# Patient Record
Sex: FEMALE | Race: WHITE | Employment: FULL TIME | URBAN - METROPOLITAN AREA
[De-identification: names, ages, dates, MRNs, and addresses within clinical notes are randomized per-mention and may not be internally consistent; named-entity substitution may affect disease eponyms.]

---

## 2019-10-22 LAB
CHLAMYDIA, EXTERNAL: NEGATIVE
HBSAG, EXTERNAL: NEGATIVE
HIV, EXTERNAL: NEGATIVE
N. GONORRHEA, EXTERNAL: NEGATIVE
RUBELLA, EXTERNAL: NORMAL
T. PALLIDUM, EXTERNAL: NEGATIVE

## 2020-05-08 LAB — GRBS, EXTERNAL: NEGATIVE

## 2020-05-27 ENCOUNTER — HOSPITAL ENCOUNTER (OUTPATIENT)
Dept: PREADMISSION TESTING | Age: 35
Discharge: HOME | End: 2020-05-27
Payer: COMMERCIAL

## 2020-05-27 DIAGNOSIS — Z20.822 ENCOUNTER FOR LABORATORY TESTING FOR COVID-19 VIRUS: ICD-10-CM

## 2020-05-27 PROCEDURE — 87635 SARS-COV-2 COVID-19 AMP PRB: CPT

## 2020-05-28 LAB — SARS-COV-2, COV2NT: NOT DETECTED

## 2020-06-04 ENCOUNTER — HOSPITAL ENCOUNTER (INPATIENT)
Age: 35
LOS: 3 days | Discharge: HOME | End: 2020-06-07
Attending: OBSTETRICS & GYNECOLOGY | Admitting: OBSTETRICS & GYNECOLOGY
Payer: COMMERCIAL

## 2020-06-04 PROBLEM — O09.90 HIGH-RISK PREGNANCY: Status: ACTIVE | Noted: 2020-06-04

## 2020-06-04 LAB
BASOPHILS # BLD: 0 K/UL (ref 0–0.1)
BASOPHILS NFR BLD: 0 % (ref 0–1)
DIFFERENTIAL METHOD BLD: ABNORMAL
EOSINOPHIL # BLD: 0.1 K/UL (ref 0–0.4)
EOSINOPHIL NFR BLD: 1 % (ref 0–7)
ERYTHROCYTE [DISTWIDTH] IN BLOOD BY AUTOMATED COUNT: 12.8 % (ref 11.5–14.5)
HCT VFR BLD AUTO: 32.1 % (ref 35–47)
HGB BLD-MCNC: 10.5 G/DL (ref 11.5–16)
IMM GRANULOCYTES # BLD AUTO: 0 K/UL (ref 0–0.04)
IMM GRANULOCYTES NFR BLD AUTO: 0 % (ref 0–0.5)
LYMPHOCYTES # BLD: 1.6 K/UL (ref 0.8–3.5)
LYMPHOCYTES NFR BLD: 16 % (ref 12–49)
MCH RBC QN AUTO: 30 PG (ref 26–34)
MCHC RBC AUTO-ENTMCNC: 32.7 G/DL (ref 30–36.5)
MCV RBC AUTO: 91.7 FL (ref 80–99)
MONOCYTES # BLD: 0.7 K/UL (ref 0–1)
MONOCYTES NFR BLD: 7 % (ref 5–13)
NEUTS SEG # BLD: 7.6 K/UL (ref 1.8–8)
NEUTS SEG NFR BLD: 76 % (ref 32–75)
NRBC # BLD: 0 K/UL (ref 0–0.01)
NRBC BLD-RTO: 0 PER 100 WBC
PLATELET # BLD AUTO: 154 K/UL (ref 150–400)
PMV BLD AUTO: 10.9 FL (ref 8.9–12.9)
RBC # BLD AUTO: 3.5 M/UL (ref 3.8–5.2)
WBC # BLD AUTO: 10 K/UL (ref 3.6–11)

## 2020-06-04 PROCEDURE — 85025 COMPLETE CBC W/AUTO DIFF WBC: CPT

## 2020-06-04 PROCEDURE — 65270000029 HC RM PRIVATE

## 2020-06-04 PROCEDURE — 3E033VJ INTRODUCTION OF OTHER HORMONE INTO PERIPHERAL VEIN, PERCUTANEOUS APPROACH: ICD-10-PCS | Performed by: OBSTETRICS & GYNECOLOGY

## 2020-06-04 PROCEDURE — 36415 COLL VENOUS BLD VENIPUNCTURE: CPT

## 2020-06-04 PROCEDURE — 75410000002 HC LABOR FEE PER 1 HR

## 2020-06-04 RX ORDER — FENTANYL CITRATE 50 UG/ML
50 INJECTION, SOLUTION INTRAMUSCULAR; INTRAVENOUS
Status: DISCONTINUED | OUTPATIENT
Start: 2020-06-04 | End: 2020-06-06 | Stop reason: HOSPADM

## 2020-06-04 RX ORDER — TERBUTALINE SULFATE 1 MG/ML
0.25 INJECTION SUBCUTANEOUS AS NEEDED
Status: DISCONTINUED | OUTPATIENT
Start: 2020-06-04 | End: 2020-06-06 | Stop reason: HOSPADM

## 2020-06-04 RX ORDER — OXYTOCIN/0.9 % SODIUM CHLORIDE 30/500 ML
0-25 PLASTIC BAG, INJECTION (ML) INTRAVENOUS
Status: DISCONTINUED | OUTPATIENT
Start: 2020-06-05 | End: 2020-06-06 | Stop reason: HOSPADM

## 2020-06-04 NOTE — PROGRESS NOTES
In to see patient  Cat 1 tracing  Cervix 2cm/50%/-3  Vertex  Worley placed, 60mL sterile saline placed in both uterine and vaginal balloons  Will start pitocin at 0400    GBS neg and COVID neg

## 2020-06-04 NOTE — H&P
Patient's Care Team  OB/GYN: Lizette Grigsby MD (VIRTUAL VISITS AVAILABLE): [facility], [place], [address] [facility], Ph [phone], Fax [phone] NPI: [id number]  Primary Care Provider: Jah Pratt: [address], [address] [facility], [address] [place] [address], Ph [phone], Fax [phone] NPI: [id number]  Patient's Pharmacies  [facility]ACY #[id number] [facility]): [address], [address], Ph [phone], Fax [phone]  06/03/2020 08:27 am  Wt: 196 lbs (88.9 kg) BP: 118/74     Allergies  Allergies not reviewed (last reviewed 02/21/2020)     NKDA   Medications  Reviewed Medications     dextroamphetamine-amphetamine 10 mg tablet  TAKE 2 AND 1/2 TABLETS BY MOUTH ONCE DAILY 09/15/19   [facility]   Prenatal 10/22/19   entered Mabel Vazquez   RhoGAM Ultra-Filtered PLUS 1,500 unit (300 mcg) intramuscular syringe  Inject 1 syringe(s) by intramuscular route. Administer Note: Injection administered without difficulty and patient tolerated well. 03/13/20   administered Napoleon Poe MD   sulfamethoxazole 800 mg-trimethoprim 160 mg tablet  TK 1 T PO BID FOR 10 DAYS 07/28/19   filled surescripts   Vaccines  Vaccines not reviewed (last reviewed 10/22/2019)    Vaccine Type Date Amt. Route Site [address] Lot # Mfr. Exp.   Date Date  on VIS VIS  Given Vaccinator   Diphtheria, Tetanus, Pertussis   Tdap 03/13/20 0.5 mL Intramuscular Deltoid, Right   GlaxoSmithKline 08/27/22 02/24/15 03/13/20 ELVIRA [address]                                                     Hepatitis B   Hep B 12/28/11                                                               Influenza   influenza, injectable, quadrivalent, preservative free 10/22/19 0.5 mL Intramuscular Deltoid, Left  [id number] Seqirus 06/30/20 08/15/19 10/22/19 Mabel Vazquez                                                     Problems  Reviewed Problems     Acute vaginitis - Onset: 08/31/2016 - Entered By: Shayna Malik MDSigned By: Shayna Malik MD Description: Vaginitis acute code: 616.10   Irregular periods - Onset: 2017 - Entered By: Wendi Spears MDSigned By: Wendi Spears MD Description: Irregular menses code: 0.2   Family history of breast cancer - Onset: 2011 - Entered By: Skye Dickerson MA - Team 4 SHPSigned By: Brandie Keller JOYA-BC Description: FAMILY HISTORY BREAST CANCER code: V17.4  Family History  Discussed Family History    Paternal Grandfather - Diabetes mellitus   Paternal Grandmother - Malignant tumor of breast   Social History  Discussed Social History  OB/GYN Social History  Tobacco Smoking Status: Never smoker  Marital status:  (Notes: stable relationship)  Number of children: 2  Are you working: Yes  Occupation: Property Management  On average, how many days per week do you engage in moderate to strenuous EXERCISE (like walking fast, running, jogging, dancing, swimming, biking, or other activities that cause a light or heavy sweat)?: (Notes: hot yoga bar)  How often do you have a DRINK containing ALCOHOL?: Never  Illicit drugs: no  Have you recently (within the last 12 weeks, or during a current pregnancy) traveled to or lived in a Zika-affected area?: N  Is blood transfusion acceptable in an emergency?: Y  Surgical History  Reviewed Surgical History     Tonsillectomy   Breast surgery - augmentation  GYN History  Reviewed GYN History  Date of LMP: (Notes: 18 had some bleeding post mirena removal, unsure if a period or not). Date of Last Pap Smear: 2017 (Notes: NIL). Abnormal Pap: Y (Notes: distant history). HPV Vaccine: N. Sexually Active?: Y.  STIs/STDs: N.  Current Birth Control Method: None. Obstetric History  Reviewed Obstetric History    TOTAL FULL PRE AB. I AB.  S ECTOPICS MULTIPLE LIVING   3 2      2   \"Jessica\" and \"Armond\"  Past Pregnancies  Date # Fetuses GA Wks Labor Length Birth Weight Sex Delivery Type Outcome Anesthesia Delivery Place  Labor Notes Source 2012 1 40.9 9 hrs. 8 lbs. 14 oz. F Vaginal delivery Full Term Birth [facility]  N Saida-Apgars 8&9. Second degree laceration;post dates cervidil historical   2014 1 39.1 10 hrs. 8 lbs. 8.99 oz. M Vaginal delivery Full Term Birth [facility]  Kacey Deloris - Apgars 9/9, 2nd degree laceration, desires circ historical   Pregnancy Problem List   RhD negative - A NEG   Advanced maternal age  - MT20 WNL, XX GBS neg    consider IOL first week  per patient request  PNR faxed to [facility] L&D  Genetic Screening and Infection History  Patient's Age Will Be 28 Years Or Older At Estimated Date of Delivery: Y  Other Infection History: Y, pt had the chicken pox. 2824: Thalassemia ([facility], [place], Mediterranean, Or  Background): MCV < 80: N  Neural Tube Defect (Meningomyelocele, Spina Bifida, Or Anencephaly): N  746: Congenital Heart Defect: N  7580: Down Syndrome: N  Antolin-Sachs (eg, Eva Shannon, [facility]): N  Canavan Disease: N  282: Sickle Cell Disease Or Trait (): N  Hemophilia Or Other Blood Disorders: N  359: Muscular Dystrophy: N  2770: Cystic Fibrosis: N  3334: Oceana's Chorea: N  319: Mental Retardation/Autism: N  If Yes, Was Person Tested For Fragile X?: N  Other Inherited Genetic Or Chromosomal Disorder: N  Maternal Metabolic Disorder (eg, Type 1 Diabetes, PKU): N  Patient Or [de-identified] Father Had A Child With Birth Defects Not Listed Above: N  Recurrent Pregnancy Loss, Or A Stillbirth: N  Medications (including Supplements, Vitamins, Herbs, OTC Drugs), Illicit/Recreational Drugs, Alcohol: N  If Yes, Agent(s) And Strength/Dosage: N  Any Other Genetic History: N  Live With Someone With TB Or Exposed To TB: N  Patient Or Partner Has History Of Genital Herpes: N  Rash Or Viral Illness Since Last Menstrual Period: N  History Of STD, Gonorrhea, Chlamydia, HPV, Syphilis: N  Prior GBS-infected child: N  History of Hepatitis: N, immunized against Hep B.   History of HIV: N  Prenatal Flowsheet  Fundus Pres FHR FM PLS Cervix Exam BP Wt Edema Glucose Protein Leukocytes Nitrite Ketones Blood   10/22/2019   7 wks 3 days   avaclavik                         164 No   106/70 133 lbs none         Comments: Has seen V previous pt of Dr. Roberto Carlos Sultana. [facility] delivery planned. FOB \" Pedrito\". Declined nuchal translucency and cystic fibrosis w Kem Arredondo. Interested in Maternity 21 so they can know gender early. Has been very nauseous, not drinking much water because it makes her nauseous. Visit schedule, deck, coverage reviewed. Flu shot today. Reviewed pt ok to RTC at >10w for [id number]. RTC 4 weeks for routine appt. 11/22/2019   11 wks 6 days   avaclavik                         164 No   106/66 140 lbs none none neg       Comments: Starting to feel better, occ HA, nausea comes and goes, no vomiting, had some spotting after intercourse, lasting 4-5 days. Doing well. RTC 4 weeks. AFP next appt. 12/20/2019   15 wks 6 days   avaclavik                         156 Yes   110/72 148 lbs none none neg       Comments: Doing well. Still has occasional headaches but thinks its due to lack of water, otherwise doing fine! Feels flutters. Staying local for holidays. AFP today. RTC 4 weeks. 01/20/2020   20 wks 2 days                                          01/20/2020   20 wks 2 days   avaclavik                        Vertex 142 Yes   110/62 159 lbs none none neg       Comments: EFW 397g/84%. Tired but thinks it could be because she's eating more. Has a \"heavy feeling\" in her vagina, particularly after intercourse. Otherwise doing well. Weight gain reviewed. RTC 4 weeks. 02/14/2020   23 wks 6 days   avaclavik                       23 cm Vertex 142 Yes   108/68 164 lbs none         Comments: Doing well. Some back pain but otherwise doing well!   02/21/2020   24 wks 6 days   ssarraf                         150 Yes   124/24 166 lbs none none neg       Comments: (Dr. Latoya Berman pt) Pt here for OB problem- abdominal rash. See HPI.   03/06/2020   26 wks 6 days   avaclavik                         147 Yes   108/70 171 lbs none none neg       Comments: rm 32 - OB Problem --Heart palpitations. SP02 WNL, HR WNL. Monitor for now. If persistent recommend cardiology. 03/13/2020   27 wks 6 days   avaclavik                       28 cm  155 Yes   116/62 173 lbs trace none neg       Comments: rm 32: Doing well. Glucola, TDaP, and Rhogam today. Travelling to [place] to get away from Coronavirus. Breast pump ordered   03/24/2020   29 wks 3 days   avaclavik                         155 Yes   126/72 179 lbs trace         Comments: Emotional, thinks its hormones and being at home with her kids, otherwise doing ok. Had a good trip to [place] -- was only at her parent's house. Wants IOL for 39w. Will tentatively plan for 6/2 --> [place]lav[place] will send in schedule request a little closer to the date. RTC 2 weeks   04/10/2020   31 wks 6 days   btozer                       32 cm Vertex 150 Yes   121/74 180 lbs trace none trace       Comments: Feeling well, no concerns. Covid-19 precautions reviewed and hospital changes discussed. Desires 39 week IOL. Discussed virtual visits. 04/24/2020   33 wks 6 days   btozer                          Yes   139/72 182 lbs none         Comments: Virtual visit- Feeling great, baby is active. No concerns. GBS next visit. 05/08/2020   35 wks 6 days   btozer                       36 cm Vertex 142 Yes  1cm / 30% / -4 110/72 185 lbs 1+         Comments: Getting more uncomfortable. Having some random contractions, nothing consistent. Sleep is more difficult. No n/v, bleeding or spotting. No LOF. Increase in swelling in hands, feet and legs. Feels like her vagina is swollen. GBS today, no pcn allergy. Baby is active, ready at home. Cervix is unfavorable -anticipate this will change, but advised against elective IOL if it doesn't - patient is in agreemnet. Plan IOL first week of June if cervix is favorable.    05/15/2020   36 wks 6 days btozer                       37 cm Vertex 146 Yes  1cm / 30% / -4 116/78 188 lbs 1+ none trace       Comments: Doing well! Reviewed GBS negative. Feeling uncomfortable. Desires IOL first week of June if cervix more favorable. 05/20/2020   37 wks 4 days   avaclavik                       36 cm Vertex 151 Yes  1cm / 30% / -4 110/68 192 lbs 1+         Comments: Getting more uncomfortable, ready. No HAs, n/v, bleeding or spotting. Reviewed COVID testing next week. Pre-reg reviewed. RTC 1 week if undelivered. Reviewed possible IOL 1st week of June.   05/27/2020   38 wks 4 days   avaclavik                       38 cm Vertex 145 Yes  1cm / 30% / -4 118/70 194 lbs 1+         Comments: Doing well. No changes no contractions/ LOF or VB. Ready to have baby! Would like induction late 39th week. Attempted membrane sweep today -- difficult due to long cervix. COVID testing today. Precautions and labor handout today. RTC 1 week -- will finalize delivery plan at next appt   06/03/2020   39 wks 4 days   avaclavik                        Vertex 140 Yes  2cm / 30% / -3 118/74 196 lbs 1+         Comments: Feels more swollen, more uncomfortable to make a fist, lips are swollen. Thinks she lost her mucous plug last Friday 5/29. No bleeding or spotting. No contractions but she does have some cramping. Strongly desires IOL ASAP. OB Lab Results    Result Value Ref.  Range Date Collected Date Reviewed Reviewed By Note   Initial Labs             Blood Type A  10/22/2019 10/24/2019 avaclavik        D (Rh) Type Negative  10/22/2019 10/24/2019 avaclavik        Antibody Screen Negative NEGATIVE 10/22/2019 10/24/2019 avaclavik        Antibody Screen Negative NEGATIVE 03/13/2020 03/17/2020 avaclavik        HCT - Initial 37.8 % 34.0-46.6 10/22/2019 10/24/2019 avaclavik        HGB - Initial 12.7 g/dL 11.1-15.9 10/22/2019 10/24/2019 avaclavik        MCV - Initial 89 fL 79-97 10/22/2019 10/24/2019 avaclavik        PLT - Initial 185 x10E3/uL 150-450 10/22/2019 10/24/2019 avaclavik        VDRL - Initial   10/22/2019 10/24/2019 avaclavik        Urine Culture/Screen No growth  10/22/2019 10/24/2019 avaclavik        HBsAg Negative NEGATIVE 10/22/2019 10/24/2019 avaclavik        HIV Counseling/Testing Non Reactive NON REACTIVE 10/22/2019 10/24/2019 avaclavik        Chlamydia - Initial Negative NEGATIVE 10/22/2019 10/24/2019 avaclavik        Gonorrhea - Initial Negative NEGATIVE 10/22/2019 10/24/2019 avaclavik        Varicella             Rubella 1.13 index IMMUNE >0.99 10/22/2019 10/24/2019 avaclavik    Optional Labs             HGB Electrophoresis             PPD/Quanta             Pap Test             Cystic Fibrosis             HPV             Antolin-Sachs             Familial Dysautonomia             Genetic Screening Tests             NST             TSH             Drug screen             HCV Ab             HCV RNA             Urinalysis         8-20 Week Labs             Ultrasound - Initial             1st Trimester Aneuploidy Risk Assessment             MSAFP/Multiple Markers   12/20/2019 12/22/2019 avaclavik        2nd Trimester Serum Screening             Amnio/CVS             Karyotype             Amniotic Fluid (AFP)         24-28 Week Labs             HCT - 24-28 Weeks 34.7 % 34.0-46.6 03/13/2020 03/17/2020 avaclavik        HGB - 24-28 Weeks 11.8 g/dL 11.1-15.9 03/13/2020 03/17/2020 avaclavik        MCV - 24-28 Weeks             PLT - 24-28 Weeks 164 x10E3/uL 150-450 03/13/2020 03/17/2020 avaclavik        Diabetes Screen 103 mg/dL  03/13/2020 03/17/2020 avaclavik        GTT (If Screen Abnormal)             D (Rh) Antibody Screen             Anti-D Immune Globulin (RhlG) Given (0 weeks)   03/13/2020 avaclavik acastle6    32-36 Week Labs             HCT - 32-36 Weeks             HGB - 32-36 Weeks             MCV - 32-36 Weeks             PLT - 32-36 Weeks             Ultrasound - 32-36 Weeks             HIV (When Indicated)             VDRL - 32-36 Weeks   2020 avaclavik        Gonorrhea - 32-36 Weeks             Chlamydia - 32-36 Weeks             Depression screening (when indicated)         35-37 Week Labs             Group B Strep Negative NEGATIVE 2020 btozer        Resistance testing if penicillin allergic         Other Labs             Other         Past Medical History  Discussed Past Medical History  Dermatologic Disorder: Y - Psoriasis  HPI  Pt is a 37 yo  at 41+ wga presenting for elective IOL. Pregnancy has been uncomplicated. ROS  Additionally reports: Except as noted in the HPI, the review of systems is negative for General, Breast, , Resp, GI, CV, Endo, MS, Psych and Heme. Physical Exam  Patient is a 65-year-old female. Constitutional: General Appearance: well developed and nourished. Eyes: Eyes extraocular movements intact and do not invoke pain. Eyelids normal appearance. Ears, Nose, Throat: Mouth: no swelling of face or lips and normal appearance. Neck: Thyroid: normal appearance. Lungs / Chest: Respiratory effort: unlabored and speaking in full sentences. Skin: General Skin normal general appearance. Neurologic: Cranial Nerves facial movement symmetric. Mental Status Exam: Orientation alert and oriented. Affect: appropriate affect. Mood: appropriate mood. Language and Speech: normal speech. Assessment / Plan  1. Routine  care  Z34.83: Encounter for supervision of other normal pregnancy, third trimester    2. Gestation period, 39 weeks -  Admit to L&D. Worley bulb for cervical ripening. Pitocin at MN. GBS neg . EFM/Owendale.  AROM PRN. Epidural PRN.   Anticipate .  Z3A.39: 39 weeks gestation of pregnancy

## 2020-06-04 NOTE — PROGRESS NOTES
1935: Bedside and Verbal shift change report given to JONH Mack, RN (oncoming nurse) by ASHLEIGH Barbour RN (offgoing nurse). Report included the following information SBAR, Kardex, Procedure Summary, Intake/Output, MAR, Accordion and Recent Results. 2030: Dr. Alec Shannon at pt bedside discussing plan of care for the night. Pt given opportunity to ask questions and verbalized understanding. 0400: Pitocin started    1744: Worley Balloon came out in pt bed.     0752: Dr. Mauri Vernon at bedside.   SVE per MD 4/50/-3

## 2020-06-05 ENCOUNTER — ANESTHESIA EVENT (OUTPATIENT)
Dept: LABOR AND DELIVERY | Age: 35
End: 2020-06-05
Payer: COMMERCIAL

## 2020-06-05 ENCOUNTER — ANESTHESIA (OUTPATIENT)
Dept: LABOR AND DELIVERY | Age: 35
End: 2020-06-05
Payer: COMMERCIAL

## 2020-06-05 PROCEDURE — 75410000000 HC DELIVERY VAGINAL/SINGLE

## 2020-06-05 PROCEDURE — 75410000003 HC RECOV DEL/VAG/CSECN EA 0.5 HR

## 2020-06-05 PROCEDURE — 0KQM0ZZ REPAIR PERINEUM MUSCLE, OPEN APPROACH: ICD-10-PCS | Performed by: OBSTETRICS & GYNECOLOGY

## 2020-06-05 PROCEDURE — 74011250636 HC RX REV CODE- 250/636: Performed by: OBSTETRICS & GYNECOLOGY

## 2020-06-05 PROCEDURE — 76060000078 HC EPIDURAL ANESTHESIA

## 2020-06-05 PROCEDURE — A4300 CATH IMPL VASC ACCESS PORTAL: HCPCS

## 2020-06-05 PROCEDURE — 75410000002 HC LABOR FEE PER 1 HR

## 2020-06-05 PROCEDURE — 74011250637 HC RX REV CODE- 250/637: Performed by: OBSTETRICS & GYNECOLOGY

## 2020-06-05 PROCEDURE — 00HU33Z INSERTION OF INFUSION DEVICE INTO SPINAL CANAL, PERCUTANEOUS APPROACH: ICD-10-PCS | Performed by: ANESTHESIOLOGY

## 2020-06-05 PROCEDURE — 77030019905 HC CATH URETH INTMIT MDII -A

## 2020-06-05 PROCEDURE — 74011250636 HC RX REV CODE- 250/636: Performed by: ANESTHESIOLOGY

## 2020-06-05 PROCEDURE — 65270000029 HC RM PRIVATE

## 2020-06-05 PROCEDURE — 74011000250 HC RX REV CODE- 250: Performed by: ANESTHESIOLOGY

## 2020-06-05 RX ORDER — OXYTOCIN/RINGER'S LACTATE 20/1000 ML
999 PLASTIC BAG, INJECTION (ML) INTRAVENOUS AS NEEDED
Status: DISCONTINUED | OUTPATIENT
Start: 2020-06-05 | End: 2020-06-07 | Stop reason: HOSPADM

## 2020-06-05 RX ORDER — NALOXONE HYDROCHLORIDE 0.4 MG/ML
0.4 INJECTION, SOLUTION INTRAMUSCULAR; INTRAVENOUS; SUBCUTANEOUS AS NEEDED
Status: DISCONTINUED | OUTPATIENT
Start: 2020-06-05 | End: 2020-06-06 | Stop reason: HOSPADM

## 2020-06-05 RX ORDER — FENTANYL CITRATE 50 UG/ML
100 INJECTION, SOLUTION INTRAMUSCULAR; INTRAVENOUS ONCE
Status: DISCONTINUED | OUTPATIENT
Start: 2020-06-05 | End: 2020-06-06 | Stop reason: HOSPADM

## 2020-06-05 RX ORDER — DOCUSATE SODIUM 100 MG/1
100 CAPSULE, LIQUID FILLED ORAL
Status: DISCONTINUED | OUTPATIENT
Start: 2020-06-05 | End: 2020-06-07 | Stop reason: HOSPADM

## 2020-06-05 RX ORDER — FENTANYL CITRATE 50 UG/ML
INJECTION, SOLUTION INTRAMUSCULAR; INTRAVENOUS AS NEEDED
Status: DISCONTINUED | OUTPATIENT
Start: 2020-06-05 | End: 2020-06-05 | Stop reason: HOSPADM

## 2020-06-05 RX ORDER — OXYTOCIN/RINGER'S LACTATE 20/1000 ML
125 PLASTIC BAG, INJECTION (ML) INTRAVENOUS AS NEEDED
Status: DISCONTINUED | OUTPATIENT
Start: 2020-06-05 | End: 2020-06-07 | Stop reason: HOSPADM

## 2020-06-05 RX ORDER — HYDROCORTISONE ACETATE PRAMOXINE HCL 2.5; 1 G/100G; G/100G
CREAM TOPICAL AS NEEDED
Status: DISCONTINUED | OUTPATIENT
Start: 2020-06-05 | End: 2020-06-07 | Stop reason: HOSPADM

## 2020-06-05 RX ORDER — EPHEDRINE SULFATE/0.9% NACL/PF 50 MG/5 ML
12.5 SYRINGE (ML) INTRAVENOUS
Status: DISCONTINUED | OUTPATIENT
Start: 2020-06-05 | End: 2020-06-06 | Stop reason: HOSPADM

## 2020-06-05 RX ORDER — SIMETHICONE 80 MG
80 TABLET,CHEWABLE ORAL
Status: DISCONTINUED | OUTPATIENT
Start: 2020-06-05 | End: 2020-06-07 | Stop reason: HOSPADM

## 2020-06-05 RX ORDER — SODIUM CHLORIDE, SODIUM LACTATE, POTASSIUM CHLORIDE, CALCIUM CHLORIDE 600; 310; 30; 20 MG/100ML; MG/100ML; MG/100ML; MG/100ML
125 INJECTION, SOLUTION INTRAVENOUS CONTINUOUS
Status: DISCONTINUED | OUTPATIENT
Start: 2020-06-05 | End: 2020-06-07 | Stop reason: HOSPADM

## 2020-06-05 RX ORDER — AMMONIA 15 % (W/V)
1 AMPUL (EA) INHALATION AS NEEDED
Status: DISCONTINUED | OUTPATIENT
Start: 2020-06-05 | End: 2020-06-07 | Stop reason: HOSPADM

## 2020-06-05 RX ORDER — BUPIVACAINE HYDROCHLORIDE 5 MG/ML
INJECTION, SOLUTION EPIDURAL; INTRACAUDAL AS NEEDED
Status: DISCONTINUED | OUTPATIENT
Start: 2020-06-05 | End: 2020-06-05 | Stop reason: HOSPADM

## 2020-06-05 RX ORDER — FENTANYL/BUPIVACAINE/NS/PF 2-1250MCG
1-16 PREFILLED PUMP RESERVOIR EPIDURAL CONTINUOUS
Status: DISCONTINUED | OUTPATIENT
Start: 2020-06-05 | End: 2020-06-07 | Stop reason: HOSPADM

## 2020-06-05 RX ORDER — LIDOCAINE HYDROCHLORIDE AND EPINEPHRINE 15; 5 MG/ML; UG/ML
INJECTION, SOLUTION EPIDURAL AS NEEDED
Status: DISCONTINUED | OUTPATIENT
Start: 2020-06-05 | End: 2020-06-05 | Stop reason: HOSPADM

## 2020-06-05 RX ORDER — SODIUM CHLORIDE 0.9 % (FLUSH) 0.9 %
5-40 SYRINGE (ML) INJECTION AS NEEDED
Status: DISCONTINUED | OUTPATIENT
Start: 2020-06-05 | End: 2020-06-07 | Stop reason: HOSPADM

## 2020-06-05 RX ORDER — BUPIVACAINE HYDROCHLORIDE 5 MG/ML
30 INJECTION, SOLUTION EPIDURAL; INTRACAUDAL AS NEEDED
Status: DISCONTINUED | OUTPATIENT
Start: 2020-06-05 | End: 2020-06-06 | Stop reason: HOSPADM

## 2020-06-05 RX ORDER — IBUPROFEN 400 MG/1
800 TABLET ORAL EVERY 8 HOURS
Status: DISCONTINUED | OUTPATIENT
Start: 2020-06-05 | End: 2020-06-07 | Stop reason: HOSPADM

## 2020-06-05 RX ORDER — ONDANSETRON 2 MG/ML
4 INJECTION INTRAMUSCULAR; INTRAVENOUS
Status: DISCONTINUED | OUTPATIENT
Start: 2020-06-05 | End: 2020-06-07 | Stop reason: HOSPADM

## 2020-06-05 RX ORDER — OXYCODONE AND ACETAMINOPHEN 5; 325 MG/1; MG/1
1 TABLET ORAL
Status: DISCONTINUED | OUTPATIENT
Start: 2020-06-05 | End: 2020-06-07 | Stop reason: HOSPADM

## 2020-06-05 RX ORDER — SODIUM CHLORIDE 0.9 % (FLUSH) 0.9 %
5-40 SYRINGE (ML) INJECTION EVERY 8 HOURS
Status: DISCONTINUED | OUTPATIENT
Start: 2020-06-05 | End: 2020-06-07 | Stop reason: HOSPADM

## 2020-06-05 RX ORDER — DIPHENHYDRAMINE HYDROCHLORIDE 50 MG/ML
12.5 INJECTION, SOLUTION INTRAMUSCULAR; INTRAVENOUS
Status: DISCONTINUED | OUTPATIENT
Start: 2020-06-05 | End: 2020-06-07 | Stop reason: HOSPADM

## 2020-06-05 RX ORDER — HYDROCORTISONE 1 %
CREAM (GRAM) TOPICAL AS NEEDED
Status: DISCONTINUED | OUTPATIENT
Start: 2020-06-05 | End: 2020-06-07 | Stop reason: HOSPADM

## 2020-06-05 RX ORDER — LIDOCAINE HYDROCHLORIDE 10 MG/ML
INJECTION INFILTRATION; PERINEURAL
Status: DISPENSED
Start: 2020-06-05 | End: 2020-06-06

## 2020-06-05 RX ORDER — DIPHENHYDRAMINE HYDROCHLORIDE 50 MG/ML
12.5 INJECTION, SOLUTION INTRAMUSCULAR; INTRAVENOUS
Status: DISCONTINUED | OUTPATIENT
Start: 2020-06-05 | End: 2020-06-06 | Stop reason: HOSPADM

## 2020-06-05 RX ADMIN — Medication 10 ML/HR: at 13:10

## 2020-06-05 RX ADMIN — OXYTOCIN 2 MILLI-UNITS/MIN: 10 INJECTION, SOLUTION INTRAMUSCULAR; INTRAVENOUS at 04:00

## 2020-06-05 RX ADMIN — LIDOCAINE HYDROCHLORIDE,EPINEPHRINE BITARTRATE 5 ML: 15; .005 INJECTION, SOLUTION EPIDURAL; INFILTRATION; INTRACAUDAL; PERINEURAL at 13:03

## 2020-06-05 RX ADMIN — IBUPROFEN 800 MG: 400 TABLET, FILM COATED ORAL at 21:54

## 2020-06-05 RX ADMIN — OXYTOCIN 12 MILLI-UNITS/MIN: 10 INJECTION, SOLUTION INTRAMUSCULAR; INTRAVENOUS at 06:47

## 2020-06-05 RX ADMIN — SODIUM CHLORIDE, SODIUM LACTATE, POTASSIUM CHLORIDE, AND CALCIUM CHLORIDE 125 ML/HR: 600; 310; 30; 20 INJECTION, SOLUTION INTRAVENOUS at 04:00

## 2020-06-05 RX ADMIN — OXYTOCIN 10 MILLI-UNITS/MIN: 10 INJECTION, SOLUTION INTRAMUSCULAR; INTRAVENOUS at 06:05

## 2020-06-05 RX ADMIN — SODIUM CHLORIDE, SODIUM LACTATE, POTASSIUM CHLORIDE, AND CALCIUM CHLORIDE 125 ML/HR: 600; 310; 30; 20 INJECTION, SOLUTION INTRAVENOUS at 11:56

## 2020-06-05 RX ADMIN — FENTANYL CITRATE 100 MCG: 50 INJECTION, SOLUTION INTRAMUSCULAR; INTRAVENOUS at 13:03

## 2020-06-05 RX ADMIN — BUPIVACAINE HYDROCHLORIDE 2 ML: 5 INJECTION, SOLUTION EPIDURAL; INTRACAUDAL; PERINEURAL at 13:03

## 2020-06-05 NOTE — PROGRESS NOTES
0750 Bedside and Verbal shift change report given to DESHAWN Bhandari RN  (oncoming nurse) by Tanner Modi RN  (offgoing nurse). Report included the following information SBAR, MAR and Recent Results. \    Bedside and Verbal shift change report given to Behzad Hurt RN  (oncoming nurse) by DESHAWN Bhandari RN  (offgoing nurse). Report included the following information SBAR, Procedure Summary, Intake/Output, MAR and Recent Results.

## 2020-06-05 NOTE — ANESTHESIA PROCEDURE NOTES
Epidural Block    Start time: 6/5/2020 12:55 PM  End time: 6/5/2020 1:05 PM  Performed by: Lisa Duenas MD  Authorized by: Lisa Duenas MD     Pre-Procedure  Indication: labor epidural    Preanesthetic Checklist: patient identified, risks and benefits discussed, anesthesia consent, site marked, patient being monitored, timeout performed and anesthesia consent    Timeout Time: 12:55        Epidural:   Patient position:  Left lateral decubitus  Prep region:  Lumbar  Prep: Chlorhexidine    Location:  L2-3    Needle and Epidural Catheter:   Needle Type:  Tuohy  Needle Gauge:  17 G  Injection Technique:  Loss of resistance using air  Attempts:  1  Catheter Size:  19 G  Events: no blood with aspiration, no cerebrospinal fluid with aspiration, no paresthesia and negative aspiration test    Test Dose:  Bupivacaine 0.25% and negative    Assessment:   Catheter Secured:  Tegaderm and tape  Insertion:  Uncomplicated  Patient tolerance:  Patient tolerated the procedure well with no immediate complications

## 2020-06-05 NOTE — PROGRESS NOTES
Labor Progress Note  Patient seen, fetal heart rate and contraction pattern evaluated. Comfortable w epidural.    VSS, pitocin @ 10  Fetal Heart Rate: moderate variability  Accelerations: yes  Decelerations: none`   Uterine Activity: q3min  Cervical Exam: 7/75/-3    Assessment/Plan:  Reassuring fetal status. Will continue current management.

## 2020-06-05 NOTE — PROGRESS NOTES
Labor Progress Note  Patient seen, fetal heart rate and contraction pattern evaluated. Feeling some contractions    VSS, pitocin @   Fetal Heart Rate: moderate variability  Accelerations: no  Decelerations: no  Uterine Activity: q2min  Cervical Exam: 4/50/-3  Membranes:  Intact    Assessment/Plan:  Reassuring fetal status. Will continue current management.   Epidural when desired

## 2020-06-05 NOTE — PROGRESS NOTES
1145 Bedside report received from Milton Watson RN.   1200 Dr Jnaice Villagomez at bedside, SVE 4/75/-2. AROM, clear fluid.    [address] Rene Noonan at bedside to place epidural.   1533 Dr Janice Villagomez at bedside, 7/75/-3.   1816 Dr Janice Villagomez at bedside, [address] 8/80/-2.   Airam Yanes Bedside report given to Tennille Parson RN

## 2020-06-05 NOTE — PROGRESS NOTES
Labor Progress Note  Patient seen, fetal heart rate and contraction pattern evaluated. Comfortable w epidural.     VSS, pitocin @ 10  Fetal Heart Rate: moderate variability  Accelerations: yes  Decelerations: variable  Uterine Activity: q3min  Cervical Exam: 8/75/-2      Assessment/Plan:  Reassuring fetal status. Will continue current management. Increase pit per protocol.

## 2020-06-05 NOTE — PROGRESS NOTES
Labor Progress Note  Assumed care from Dr. Gordon Party @ 39 weeks 5 days undergoing Elective IOL  Cook catheter in place. Occasional cramps  Patient seen, fetal heart rate and contraction pattern evaluated. VSS AF  Physical Exam:  Cervical Exam: not examined  Membranes:  Intact  Uterine Activity: Frequency: Irregular  Fetal Heart Rate: Reactive, Baseline 130  Accelerations: yes  Decelerations: none      Assessment/Plan:  Reassuring fetal status. GBS negative   Will continue to observe overnight for labor.     Plan for Pit @ 4 am      Shelbie Gee MD

## 2020-06-05 NOTE — ANESTHESIA PREPROCEDURE EVALUATION
Relevant Problems   No relevant active problems       Anesthetic History   No history of anesthetic complications            Review of Systems / Medical History  Patient summary reviewed, nursing notes reviewed and pertinent labs reviewed    Pulmonary  Within defined limits                 Neuro/Psych   Within defined limits           Cardiovascular  Within defined limits                     GI/Hepatic/Renal  Within defined limits              Endo/Other  Within defined limits           Other Findings              Physical Exam    Airway  Mallampati: I  TM Distance: > 6 cm  Neck ROM: normal range of motion   Mouth opening: Normal     Cardiovascular  Regular rate and rhythm,  S1 and S2 normal,  no murmur, click, rub, or gallop             Dental  No notable dental hx       Pulmonary  Breath sounds clear to auscultation               Abdominal  GI exam deferred       Other Findings            Anesthetic Plan    ASA: 1  Anesthesia type: epidural          Induction: Intravenous  Anesthetic plan and risks discussed with: Patient

## 2020-06-05 NOTE — PROGRESS NOTES
Labor Progress Note  Patient seen, fetal heart rate and contraction pattern evaluated. Some contractions stronger. VSS, pitocin @ 20  Fetal Heart Rate: moderate variability  Accelerations: yes  Decelerations: variable  Uterine Activity: Irregular, q2-3min  Cervical Exam: 4/75/-2 - AROM clear      Assessment/Plan:  Reassuring fetal status. Will continue current management. Pit halved, will increase as needed.   Epidural when desired

## 2020-06-06 PROCEDURE — 65410000002 HC RM PRIVATE OB

## 2020-06-06 PROCEDURE — 74011250637 HC RX REV CODE- 250/637: Performed by: OBSTETRICS & GYNECOLOGY

## 2020-06-06 PROCEDURE — 85461 HEMOGLOBIN FETAL: CPT

## 2020-06-06 PROCEDURE — 74011250636 HC RX REV CODE- 250/636: Performed by: OBSTETRICS & GYNECOLOGY

## 2020-06-06 PROCEDURE — 86900 BLOOD TYPING SEROLOGIC ABO: CPT

## 2020-06-06 PROCEDURE — 36415 COLL VENOUS BLD VENIPUNCTURE: CPT

## 2020-06-06 RX ORDER — IBUPROFEN 800 MG/1
800 TABLET ORAL EVERY 8 HOURS
Qty: 30 TAB | Refills: 1 | Status: ON HOLD | OUTPATIENT
Start: 2020-06-06 | End: 2022-08-02 | Stop reason: SDUPTHER

## 2020-06-06 RX ADMIN — HUMAN RHO(D) IMMUNE GLOBULIN 0.3 MG: 300 INJECTION, SOLUTION INTRAMUSCULAR at 16:20

## 2020-06-06 RX ADMIN — IBUPROFEN 800 MG: 400 TABLET, FILM COATED ORAL at 06:04

## 2020-06-06 RX ADMIN — IBUPROFEN 800 MG: 400 TABLET, FILM COATED ORAL at 14:16

## 2020-06-06 RX ADMIN — DOCUSATE SODIUM 100 MG: 100 CAPSULE, LIQUID FILLED ORAL at 14:16

## 2020-06-06 RX ADMIN — OXYCODONE HYDROCHLORIDE AND ACETAMINOPHEN 1 TABLET: 5; 325 TABLET ORAL at 14:17

## 2020-06-06 RX ADMIN — OXYCODONE HYDROCHLORIDE AND ACETAMINOPHEN 1 TABLET: 5; 325 TABLET ORAL at 09:30

## 2020-06-06 RX ADMIN — OXYCODONE HYDROCHLORIDE AND ACETAMINOPHEN 1 TABLET: 5; 325 TABLET ORAL at 18:05

## 2020-06-06 RX ADMIN — IBUPROFEN 800 MG: 400 TABLET, FILM COATED ORAL at 21:37

## 2020-06-06 RX ADMIN — OXYCODONE HYDROCHLORIDE AND ACETAMINOPHEN 1 TABLET: 5; 325 TABLET ORAL at 04:37

## 2020-06-06 NOTE — DISCHARGE INSTRUCTIONS
POSTPARTUM DISCHARGE INSTRUCTIONS       Name:  Kristina Olson  YOB: 1985  Admission Diagnosis:  High-risk pregnancy [O09.90]     Discharge Diagnosis:    Problem List as of 6/6/2020 Date Reviewed: 10/21/2016          Codes Class Noted - Resolved    High-risk pregnancy ICD-10-CM: O09.90  ICD-9-CM: V23.9  6/4/2020 - Present        Normal delivery ICD-10-CM: O80  ICD-9-CM: 650  7/21/2014 - Present        Carrier of group B Streptococcus ICD-10-CM: Z22.330  ICD-9-CM: V02.51  8/12/2012 - Present        Rh negative status during pregnancy ICD-10-CM: O26.899, Z67.91  ICD-9-CM: 646.83  8/12/2012 - Present            Attending Physician:  Debbie Lindsey MD    Delivery Type:  Vaginal Childbirth with Episiotomy, Laceration or Tear: What To Expect At [address] [place] [address] body will slowly heal in the next few weeks. It is easy to get too tired and overwhelmed during the first weeks after your baby is born. Changes in your hormones can shift your mood without warning. You may find it hard to meet the extra demands on your energy and time. Take it easy on yourself. Follow-up care is a key part of your treatment and safety. Be sure to make and go to all appointments, and call your doctor if you are having problems. It's also a good idea to know your test results and keep a list of the medicines you take. How can you care for yourself at home? Vaginal Bleeding and Cramps  · After delivery, you will have a bloody discharge from the vagina. This will turn pink within a week and then white or yellow after about 10 days. It may last for 2 to 4 weeks or longer, until the uterus has healed. Use pads instead of tampons until you stop bleeding. · Do not worry if you pass some blood clots, as long as they are smaller than a golf ball. If you have a tear or stitches in your vaginal area, change the pad at least every 4 hours to prevent soreness and infection.     · You may have cramps for the first few days after childbirth. These are normal and occur as the uterus shrinks to normal size. Take an over-the-counter pain medicine, such as acetaminophen (Tylenol), ibuprofen (Advil, Motrin), or naproxen (Aleve), for cramps. Read and follow all instructions on the label. Do not take aspirin, because it can cause more bleeding. Do not take acetaminophen (Tylenol) and other acetaminophen containing medications (i.e. Percocet) at the same time. Episiotomy, Lacerations or Tears  · If you have stitches, they will dissolve on their own and do not need to be removed. · Put ice or a cold pack on your painful area for 10 to 20 minutes at a time, several times a day, for the first few days. Put a thin cloth between the ice and your skin. · Sit in a few inches of warm water (sitz bath) 3 times a day and after bowel movements. The warm water helps with pain and itching. If you do not have a tub, a warm shower might help. Breast fullness  · Your breasts may overfill (engorge) in the first few days after delivery. To help milk flow and to relieve pain, warm your breasts in the shower or by using warm, moist towels before nursing. · If you are not nursing, do not put warmth on your breasts or touch your breasts. Wear a tight bra or sports bra and use ice until the fullness goes away. This usually takes 2 to 3 days. · Put ice or a cold pack on your breast after nursing to reduce swelling and pain. Put a thin cloth between the ice and your skin. Activity  · Eat a balanced diet. Do not try to lose weight by cutting calories. Keep taking your prenatal vitamins, or take a multivitamin. · Get as much rest as you can. Try to take naps when your baby sleeps during the day. · Get some exercise every day. But do not do any heavy exercise until your doctor says it is okay. · Wait until you are healed (about 4 to 6 weeks) before you have sexual intercourse. Your doctor will tell you when it is okay to have sex.   · Talk to your doctor about birth control. You can get pregnant even before your period returns. Also, you can get pregnant while you are breast-feeding. Mental Health  · Many women get the \"baby blues\" during the first few days after childbirth. You may lose sleep, feel irritable, and cry easily. You may feel happy one minute and sad the next. Hormone changes are one cause of these emotional changes. Also, the demands of a new baby, along with visits from relatives or other family needs, add to a mother's stress. The \"baby blues\" often peak around the fourth day. Then they ease up in less than 2 weeks. · If your moodiness or anxiety lasts for more than 2 weeks, or if you feel like life is not worth living, you may have postpartum depression. This is different for each mother. Some mothers with serious depression may worry intensely about their infant's well-being. Others may feel distant from their child. Some mothers might even feel that they might harm their baby. A mother may have signs of paranoia, wondering if someone is watching her. · With all the changes in your life, you may not know if you are depressed. Pregnancy sometimes causes changes in how you feel that are similar to the symptoms of depression. · Symptoms of depression include:  · Feeling sad or hopeless and losing interest in daily activities. These are the most common symptoms of depression. · Sleeping too much or not enough. · Feeling tired. You may feel as if you have no energy. · Eating too much or too little. · [employer] (PSI) offers a Warm line; Chat with the Expert phone sessions; Information and Articles about Pregnancy and Postpartum Mood Disorders; Comprehensive List of Free Support Groups; Knowledgeable local coordinators who will offer support, information, and resources; Guide to Resources on [employer]; Calendar of events in the  mood disorders community;  Latest News and Research; and [facility] Section for Access and Networking. Remember - You are not alone; You are not to blame; With help, you will be well. 8-984-708-PPD(5725). WWW. POSTPARTUM. NET    · Writing or talking about death, such as writing suicide notes or talking about guns, knives, or pills. Keep the numbers for these national suicide hotlines: 7-518-520-TALK (9-417.368.4487) and 6-794-NCEZNFF (7-805.869.2123). If you or someone you know talks about suicide or feeling hopeless, get help right away. Constipation and Hemorrhoids  Drink plenty of fluids, enough so that your urine is light yellow or clear like water. If you have kidney, heart, or liver disease and have to limit fluids, talk with your doctor before you increase the amount of fluids you drink. · Eat plenty of fiber each day. Have a bran muffin or bran cereal for breakfast, and try eating a piece of fruit for a mid-afternoon snack. · For painful, itchy hemorrhoids, put ice or a cold pack on the area several times a day for 10 minutes at a time. Follow this by putting a warm compress on the area for another 10 to 20 minutes or by sitting in a shallow, warm bath. When should you call for help? Call 911 anytime you think you may need emergency care. For example, call if:  · You are thinking of hurting yourself, your baby, or anyone else. · You passed out (lost consciousness). · You have symptoms of a blood clot in your lung (called a pulmonary embolism). These may include:  · Sudden chest pain. · Trouble breathing. · Coughing up blood. Call your doctor now or seek immediate medical care if:  · You have severe vaginal bleeding. · You are soaking through a pad each hour for 2 or more hours. · Your vaginal bleeding seems to be getting heavier or is still bright red 4 days after delivery. · You are dizzy or lightheaded, or you feel like you may faint. · You are vomiting or cannot keep fluids down. · You have a fever. · You have new or more belly pain.   · You pass tissue (not just blood). · Your vaginal discharge smells bad. · Your belly feels tender or full and hard. · Your breasts are continuously painful or red. · You feel sad, anxious, or hopeless for more than a few days. · You have sudden, severe pain in your belly. · You have symptoms of a blood clot in your leg (called a deep vein thrombosis), such as:  · Pain in your calf, back of the knee, thigh, or groin. · Redness and swelling in your leg or groin. · You have symptoms of preeclampsia, such as:  · Sudden swelling of your face, hands, or feet. · New vision problems (such as dimness or blurring). · A severe headache. · Your blood pressure is higher than it should be or rises suddenly. · You have new nausea or vomiting. Watch closely for changes in your health, and be sure to contact your doctor if you have any problems. Additional Information:  Postpartum Support    PARENTS:  Are you feeling sad or depressed? Is it difficult for you to enjoy yourself? Do you feel more irritable or tense? Do you feel anxious or panicky? Are you having difficulty bonding with your baby? Do you feel as if you are \"out of control\" or \"going crazy\"? Are you worried that you might hurt your baby or yourself? FAMILIES: Do you worry that something is wrong but don't know how to help? Do you think that your partner or spouse is having problems coping? Are you worried that it may never get better? While many women experience some mild mood change or \"the blues\" during or after the birth of a child, 1 in 9 women experience more significant symptoms of depression or anxiety. 1 in 10 Dads become depressed during the first year. Things you can do  Being a good parent includes taking care of yourself. If you take care of yourself, you will be able to take better care of your baby and your family. · Talk to a counselor or healthcare provider who has training in  mood and anxiety problems.   · Learn as much as you can about pregnancy and postpartum depression and anxiety. · Get support from family and friends. Ask for help when you need it. · Join a support group in your area or online. · Keep active by walking, stretching or whatever form of exercise helps you to feel better. · Get enough rest and time for yourself. · Eat a healthy diet. · Don't give up! It may take more than one try to get the right help you need. These are general instructions for a healthy lifestyle:    No smoking/ No tobacco products/ Avoid exposure to second hand smoke    Surgeon General's Warning:  Quitting smoking now greatly reduces serious risk to your health. Obesity, smoking, and sedentary lifestyle greatly increases your risk for illness    A healthy diet, regular physical exercise & weight monitoring are important for maintaining a healthy lifestyle    Recognize signs and symptoms of STROKE:    F-face looks uneven    A-arms unable to move or move unevenly    S-speech slurred or non-existent    T-time-call 911 as soon as signs and symptoms begin - DO NOT go       back to bed or wait to see if you get better - TIME IS BRAIN. I have had the opportunity to make my options or choices for discharge. I have received and understand these instructions. Virtual postpartum support group meetings available at www. postpartumva.org

## 2020-06-06 NOTE — ROUTINE PROCESS
Bedside shift change report given to Aide Gray RNC (oncoming nurse) by Yash Craig RN (offgoing nurse). Report included the following information SBAR.

## 2020-06-06 NOTE — PROGRESS NOTES
Post-Partum Day Number 1 Progress Note    Max Viera     Assessment: Doing well, post partum day 1    Plan:  1. Continue routine postpartum and perineal care as well as maternal education. 2. N/A     Information for the patient's :  Moody Baez [[id number]]   Vaginal, Spontaneous   Patient doing well without significant complaint. Voiding without difficulty, normal lochia. Vitals:  Visit Vitals  BP 98/67 (BP 1 Location: Left arm, BP Patient Position: At rest;Sitting)   Pulse 75   Temp 97.8 °F (36.6 °C)   Resp 16   Ht 5' 6\" (1.676 m)   Wt 86.2 kg (190 lb)   Breastfeeding Unknown   BMI 30.67 kg/m²     Temp (24hrs), Av.2 °F (36.8 °C), Min:97.5 °F (36.4 °C), Max:98.7 °F (37.1 °C)        Exam:   Patient without distress. Abdomen soft, fundus firm, nontender                Perineum with normal lochia noted. Lower extremities are negative for swelling, cords or tenderness. Labs:     Lab Results   Component Value Date/Time    WBC 10.0 2020 05:45 PM    WBC 6.9 10/20/2016 03:44 PM    WBC 10.3 2014 06:49 AM    WBC 10.2 2012 12:30 AM    WBC 8.6 2011 02:25 AM    HGB 10.5 (L) 2020 05:45 PM    HGB 12.5 10/20/2016 03:44 PM    HGB 10.7 (L) 2014 06:49 AM    HGB 10.6 (L) 2012 12:30 AM    HGB 11.6 2011 02:25 AM    HCT 32.1 (L) 2020 05:45 PM    HCT 36.9 10/20/2016 03:44 PM    HCT 32.5 (L) 2014 06:49 AM    HCT 31.6 (L) 2012 12:30 AM    HCT 34.1 (L) 2011 02:25 AM    PLATELET 777  05:45 PM    PLATELET 179 6343 03:44 PM    PLATELET 959  06:49 AM    PLATELET 589  12:30 AM    PLATELET 265  02:25 AM       No results found for this or any previous visit (from the past 24 hour(s)).

## 2020-06-06 NOTE — ROUTINE PROCESS
TRANSFER - IN REPORT:    Verbal report received from [address] [place] [address] [address] , RN(name) on Cristina Mar  being received from L & D (unit) for routine progression of care      Report consisted of patients Situation, Background, Assessment and   Recommendations(SBAR). Information from the following report(s) SBAR, Intake/Output and MAR was reviewed with the receiving nurse. Opportunity for questions and clarification was provided. Assessment completed upon patients arrival to unit and care assumed.

## 2020-06-06 NOTE — L&D DELIVERY NOTE
Delivery Summary  Patient: Rama Cabrales             Circumcision:   NA-female  Additional Delivery Comments - pt pushed several times to delivery head OA, no nuchal, rest of body followed quickly and without difficulty. Baby to mom's chest, delayed cord clamping and cut by FOB. 2nd dgree perineal lac repaired in standard fashion w 2-0 vicryl. Placenta delivered spontaneously, intact, 3VC. EBL - 200        Information for the patient's :  Alec Cabello [[id number]]       Labor Events:    Labor: No    Steroids: None   Cervical Ripening Date/Time:       Cervical Ripening Type: [place]/EASI   Antibiotics During Labor: No   Rupture Identifier:      Rupture Date/Time: 2020 12:01 PM   Rupture Type: AROM   Amniotic Fluid Volume:  Moderate    Amniotic Fluid Description: Clear    Amniotic Fluid Odor:      Induction: Oxytocin       Induction Date/Time: 2020 4:00 AM    Indications for Induction: Elective    Augmentation: None   Augmentation Date/Time:      Indications for Augmentation:     Labor complications: None       Additional complications:        Delivery Events:  Indications For Episiotomy:     Episiotomy: None   Perineal Laceration(s): 2nd   Repaired:     Periurethral Laceration Location:      Repaired:     Labial Laceration Location:     Repaired:     Sulcal Laceration Location:     Repaired:     Vaginal Laceration Location:     Repaired:     Cervical Laceration Location:     Repaired:     Repair Suture: Vicryl 2-0   Number of Repair Packets: 1   Estimated Blood Loss (ml):  ml   Quantitative Blood Loss (ml)                Delivery Date: 2020    Delivery Time: 8:21 PM  Delivery Type: Vaginal, Spontaneous  Sex:  Female    Gestational Age: 37w11d   Delivery Clinician:  Lily Ansari  Living Status: Living   Delivery Location: L&D L&D Rm 10          APGARS  One minute Five minutes Ten minutes   Skin color: 1   2        Heart rate: 2   2        Grimace: 2   2        Muscle tone: 2 2        Breathin   2        Totals: 9   10            Presentation: Vertex    Position: Left      Resuscitation Method:  Tactile Stimulation     Meconium Stained: None      Cord Information: 3 Vessels  Complications: None  Cord around:    Delayed cord clamping? No  Cord clamped date/time:2020  8:23 AM  Disposition of Cord Blood: Lab    Blood Gases Sent?: No    Placenta:  Date/Time: 2020  8:34 PM  Removal: Spontaneous      Appearance: Normal     Long Bottom Measurements:  Birth Weight:        Birth Length:        Head Circumference:        Chest Circumference:       Abdominal Girth: Other Providers:   TOI APTEL, Obstetrician;Primary Nurse;Primary Long Bottom Nurse           Cord pH:  none    Episiotomy: None   Laceration(s): 2nd     Estimated Blood Loss (ml):     Labor Events  Method: Oxytocin      Augmentation: None   Cervical Ripening:        [facility] Problems  Date Reviewed: 10/21/2016          Codes Class Noted POA    High-risk pregnancy ICD-10-CM: O09.90  ICD-9-CM: V23.9  2020 Unknown              Operative Vaginal Delivery - none    Group B Strep:   Lab Results   Component Value Date/Time    GrBStrep, External negative 2020     Information for the patient's :  Ryan Sosa [[id number]]   No results found for: ABORH, PCTABR, PCTDIG, BILI, ABORHEXT, ABORH    No results found for: APH, APCO2, APO2, AHCO3, ABEC, ABDC, O2ST, EPHV, PCO2V, PO2V, HCO3V, EBEV, EBDV, SITE, RSCOM

## 2020-06-06 NOTE — DISCHARGE SUMMARY
Obstetrical Discharge Summary     Name: Hugo Morrow MRN: [id number]  SSN: [id number]    YOB: 1985  Age: 38 y.o. Sex: female      Admit Date: 2020    Discharge Date: 2020    Admitting Physician: Rick Jimenez MD     Attending Physician:  Ros Karimi MD     Admission Diagnoses: High-risk pregnancy [O09.90]    Discharge Diagnoses:   Information for the patient's :  Jeet Díaz [[id number]]   Delivery of a 4.195 kg female infant via Vaginal, Spontaneous on 2020 at 8:21 PM  by Darrell Victor. Apgars were 9  and 10 . Additional Diagnoses:   Hospital Problems  Date Reviewed: 10/21/2016          Codes Class Noted POA    High-risk pregnancy ICD-10-CM: O09.90  ICD-9-CM: V23.9  2020 Unknown             Lab Results   Component Value Date/Time    Rubella, External immune 10/22/2019    GrBStrep, External negative 2020       Hospital Course: Normal hospital course following the delivery. Disposition at Discharge: Home or self care    Discharged Condition: Stable    Patient Instructions:   Current Discharge Medication List      START taking these medications    Details   ibuprofen (MOTRIN) 800 mg tablet Take 1 Tab by mouth every eight (8) hours. Qty: 30 Tab, Refills: 1         CONTINUE these medications which have NOT CHANGED    Details   PNV Comb #2-Iron-Omega 3-FA [id number] mg cmpk Take  by mouth. Reference my discharge instructions.     Follow-up Appointments   Procedures    FOLLOW UP VISIT Appointment in: Tristen Auguste at [facility]     Dr. David Auguste at [facility]     Standing Status:   Standing     Number of Occurrences:   1     Order Specific Question:   Appointment in     Answer:   6 Weeks        Signed By:  Torsten Motta MD     2020

## 2020-06-06 NOTE — ROUTINE PROCESS
Bedside shift change report given to A Darien RN (oncoming nurse) by Estella Melton (offgoing nurse). Report included the following information SBAR.

## 2020-06-06 NOTE — LACTATION NOTE
This note was copied from a baby's chart. Initial Lactation Consultation: Infant born vaginally last evening to  mom at 44 6/7 weeks gestation. Mom nursed her 11and 9year old children with adequate milk supply. This infant is LGA with stable blood sugars. Infant seen at breast at the time of my visit. Assisted mom with maintaining a deep latch during the feeding. Provided help with positioning infant in the football position, using pillows for support. Infant sucking rhythmically with audible swallowing noted. Feeding Plan: Mother will keep baby skin to skin as often as possible, feed on demand, 8-12x/day , respond to feeding cues, obtain latch, listen for audible swallowing, be aware of signs of oxytocin release/ cramping,thirst,sleepiness while breastfeeding, offer both breasts,and will not limit feedings. Mother agrees to utilize breast massage while nursing to facilitate lactogenesis.

## 2020-06-06 NOTE — PROGRESS NOTES
- Bedside shift change report given to Oj Padilla RN (oncoming nurse) by Sarhay Kimball RN (offgoing nurse). Report included the following information SBAR. Patient feeling pressure, SVE 9/90/-2. Peanut ball and left side.  - Straight Cath 500mL, Right side peanut ball     - patient feeling lots of pressure, SVE 10/100/0.     - Begin pushing     -  of LIVE baby girl.  - TRANSFER - OUT REPORT:    Verbal report given to Akua Sharma RN (name) on [employer]  being transferred to MIU (unit) for routine progression of care       Report consisted of patients Situation, Background, Assessment and   Recommendations(SBAR). Information from the following report(s) SBAR was reviewed with the receiving nurse. Lines:   Saline Lock 20 Anterior; Left Hand (Active)   Site Assessment Clean, dry, & intact 2020  8:30 AM   Phlebitis Assessment 0 2020  8:30 AM   Infiltration Assessment 0 2020  8:30 AM   Dressing Status Clean, dry, & intact 2020  8:30 AM   Dressing Type Tape;Transparent 2020  8:30 AM   Hub Color/Line Status Infusing 2020  8:30 AM        Opportunity for questions and clarification was provided.       Patient transported with:   Registered Nurse

## 2020-06-07 VITALS
TEMPERATURE: 98.3 F | DIASTOLIC BLOOD PRESSURE: 72 MMHG | HEART RATE: 96 BPM | HEIGHT: 66 IN | WEIGHT: 190 LBS | SYSTOLIC BLOOD PRESSURE: 104 MMHG | BODY MASS INDEX: 30.53 KG/M2 | RESPIRATION RATE: 16 BRPM

## 2020-06-07 LAB
ABO + RH BLD: NORMAL
BLD PROD TYP BPU: NORMAL
BPU ID: NORMAL
FETAL SCREEN,FMHS: NORMAL
STATUS OF UNIT,%ST: NORMAL
UNIT DIVISION, %UDIV: 0
WEAK D AG RBC QL: NORMAL

## 2020-06-07 PROCEDURE — 74011250637 HC RX REV CODE- 250/637: Performed by: OBSTETRICS & GYNECOLOGY

## 2020-06-07 RX ADMIN — IBUPROFEN 800 MG: 400 TABLET, FILM COATED ORAL at 06:12

## 2020-06-07 NOTE — ROUTINE PROCESS
Bedside shift change report given to Chaim Sarabia RNC (oncoming nurse) by Esther Willard RN (offgoing nurse). Report included the following information SBAR.     0900-Pt discharged home with family. Discharge instructions and medication times reviewed. Family verbalized understanding. Signature pad not working, obtained signature on paper and placed in mother's chart.

## 2020-06-07 NOTE — PROGRESS NOTES
Post-Partum Day Number 2 Progress Note    Kristina Olson     Assessment: Doing well, post partum day 2    Plan:   1. Discharge home today  2. Follow up in office in 6 weeks with Debbie Lindsey MD  3. Post partum activity advised, diet as tolerated  4. Discharge Medications: ibuprofen, percocet and medications prior to admission    Information for the patient's :  Elizabeth Dowling [[id number]]   Vaginal, Spontaneous   Patient doing well without significant complaint. Voiding without difficulty, normal lochia. Vitals:  Visit Vitals  /71 (BP 1 Location: Left arm, BP Patient Position: At rest)   Pulse 84   Temp 98.3 °F (36.8 °C)   Resp 16   Ht 5' 6\" (1.676 m)   Wt 86.2 kg (190 lb)   Breastfeeding Unknown   BMI 30.67 kg/m²     Temp (24hrs), Av.3 °F (36.8 °C), Min:98.3 °F (36.8 °C), Max:98.4 °F (36.9 °C)      Exam:         Patient without distress. Abdomen soft, fundus firm, nontender                 Lower extremities are negative for swelling, cords or tenderness. Labs:     Lab Results   Component Value Date/Time    WBC 10.0 2020 05:45 PM    WBC 6.9 10/20/2016 03:44 PM    WBC 10.3 2014 06:49 AM    WBC 10.2 2012 12:30 AM    WBC 8.6 2011 02:25 AM    HGB 10.5 (L) 2020 05:45 PM    HGB 12.5 10/20/2016 03:44 PM    HGB 10.7 (L) 2014 06:49 AM    HGB 10.6 (L) 2012 12:30 AM    HGB 11.6 2011 02:25 AM    HCT 32.1 (L) 2020 05:45 PM    HCT 36.9 10/20/2016 03:44 PM    HCT 32.5 (L) 2014 06:49 AM    HCT 31.6 (L) 2012 12:30 AM    HCT 34.1 (L) 2011 02:25 AM    PLATELET 938  05:45 PM    PLATELET 571  03:44 PM    PLATELET 941  06:49 AM    PLATELET 306  12:30 AM    PLATELET 006  02:25 AM       No results found for this or any previous visit (from the past 24 hour(s)).

## 2020-06-07 NOTE — ROUTINE PROCESS
2000- Bedside shift change report given to ELVIRA Armendariz RN (oncoming nurse) by DESHAWN Desir RN (offgoing nurse). Report included the following information SBAR.

## 2022-03-19 PROBLEM — O09.90 HIGH-RISK PREGNANCY: Status: ACTIVE | Noted: 2020-06-04

## 2022-08-01 NOTE — PERIOP NOTES
PAT PREOP PHONE INTERVIEW COMPLETED WITH:     PATIENT ADVISED NOT TO EAT/DRINK ANYTHING PAST MIDNIGHT EVENING PRIOR TO SURGERY,  NOTHING TO EAT/DRINK MORNING OF SURGERY UNLESS SIP OF WATER TO SWALLOW MEDICATION;  LEAVE ALL VALUABLES AT HOME; DO BRING PICTURE ID, INSURANCE CARD AND ANY COPAY; WEAR COMFORTABLE CLOTHING;  NO PERFUMES, POWDERS, LOTIONS; NO ALCOHOL 24 HOURS BEFORE OR AFTER SURGERY;  WILL NEED TO BE DRIVEN HOME BY FAMILY OR FRIEND;  AVOID TAKING NSAIDS, ASPIRIN, FISH OIL, VITAMIN E OR GLUCOSAMINE/CHONDROITIN DURING THIS TIME PRIOR TO SURGERY;  MAY TAKE TYLENOL. INSTRUCTED TO REPORT  [address] BY SURGEON'S OFFICE. INSTRUCTION PROVIDED FOR CHG SOAP.

## 2022-08-02 ENCOUNTER — ANESTHESIA (OUTPATIENT)
Dept: SURGERY | Age: 37
End: 2022-08-02
Payer: COMMERCIAL

## 2022-08-02 ENCOUNTER — HOSPITAL ENCOUNTER (OUTPATIENT)
Age: 37
Setting detail: OUTPATIENT SURGERY
Discharge: HOME | End: 2022-08-02
Attending: OBSTETRICS & GYNECOLOGY | Admitting: OBSTETRICS & GYNECOLOGY
Payer: COMMERCIAL

## 2022-08-02 ENCOUNTER — ANESTHESIA EVENT (OUTPATIENT)
Dept: SURGERY | Age: 37
End: 2022-08-02
Payer: COMMERCIAL

## 2022-08-02 VITALS
OXYGEN SATURATION: 98 % | RESPIRATION RATE: 17 BRPM | HEIGHT: 66 IN | WEIGHT: 135 LBS | DIASTOLIC BLOOD PRESSURE: 78 MMHG | HEART RATE: 74 BPM | BODY MASS INDEX: 21.69 KG/M2 | SYSTOLIC BLOOD PRESSURE: 114 MMHG | TEMPERATURE: 97.7 F

## 2022-08-02 PROBLEM — O03.4 INCOMPLETE ABORTION: Status: ACTIVE | Noted: 2022-08-02

## 2022-08-02 PROCEDURE — 74011000250 HC RX REV CODE- 250: Performed by: ANESTHESIOLOGY

## 2022-08-02 PROCEDURE — 77030003666 HC NDL SPINAL BD -A: Performed by: OBSTETRICS & GYNECOLOGY

## 2022-08-02 PROCEDURE — 74011250636 HC RX REV CODE- 250/636: Performed by: ANESTHESIOLOGY

## 2022-08-02 PROCEDURE — 76210000020 HC REC RM PH II FIRST 0.5 HR: Performed by: OBSTETRICS & GYNECOLOGY

## 2022-08-02 PROCEDURE — 74011000250 HC RX REV CODE- 250: Performed by: OBSTETRICS & GYNECOLOGY

## 2022-08-02 PROCEDURE — 77030011210: Performed by: OBSTETRICS & GYNECOLOGY

## 2022-08-02 PROCEDURE — 77030030437 HC FLTR UTER VAC OCOA -A: Performed by: OBSTETRICS & GYNECOLOGY

## 2022-08-02 PROCEDURE — 76060000033 HC ANESTHESIA 1 TO 1.5 HR: Performed by: OBSTETRICS & GYNECOLOGY

## 2022-08-02 PROCEDURE — 77030008578 HC TBNG UTER SUC BUSS -A: Performed by: OBSTETRICS & GYNECOLOGY

## 2022-08-02 PROCEDURE — 74011000258 HC RX REV CODE- 258: Performed by: OBSTETRICS & GYNECOLOGY

## 2022-08-02 PROCEDURE — 76010000138 HC OR TIME 0.5 TO 1 HR: Performed by: OBSTETRICS & GYNECOLOGY

## 2022-08-02 PROCEDURE — 76210000006 HC OR PH I REC 0.5 TO 1 HR: Performed by: OBSTETRICS & GYNECOLOGY

## 2022-08-02 PROCEDURE — 2709999900 HC NON-CHARGEABLE SUPPLY: Performed by: OBSTETRICS & GYNECOLOGY

## 2022-08-02 PROCEDURE — 77030040361 HC SLV COMPR DVT MDII -B: Performed by: OBSTETRICS & GYNECOLOGY

## 2022-08-02 PROCEDURE — 74011250637 HC RX REV CODE- 250/637: Performed by: ANESTHESIOLOGY

## 2022-08-02 PROCEDURE — 74011250636 HC RX REV CODE- 250/636: Performed by: OBSTETRICS & GYNECOLOGY

## 2022-08-02 PROCEDURE — 88305 TISSUE EXAM BY PATHOLOGIST: CPT

## 2022-08-02 RX ORDER — SODIUM CHLORIDE 0.9 % (FLUSH) 0.9 %
5-40 SYRINGE (ML) INJECTION AS NEEDED
Status: DISCONTINUED | OUTPATIENT
Start: 2022-08-02 | End: 2022-08-02 | Stop reason: HOSPADM

## 2022-08-02 RX ORDER — ONDANSETRON 2 MG/ML
INJECTION INTRAMUSCULAR; INTRAVENOUS AS NEEDED
Status: DISCONTINUED | OUTPATIENT
Start: 2022-08-02 | End: 2022-08-02 | Stop reason: HOSPADM

## 2022-08-02 RX ORDER — FENTANYL CITRATE 50 UG/ML
25 INJECTION, SOLUTION INTRAMUSCULAR; INTRAVENOUS
Status: DISCONTINUED | OUTPATIENT
Start: 2022-08-02 | End: 2022-08-02 | Stop reason: HOSPADM

## 2022-08-02 RX ORDER — SODIUM CHLORIDE, SODIUM LACTATE, POTASSIUM CHLORIDE, CALCIUM CHLORIDE 600; 310; 30; 20 MG/100ML; MG/100ML; MG/100ML; MG/100ML
INJECTION, SOLUTION INTRAVENOUS
Status: DISCONTINUED | OUTPATIENT
Start: 2022-08-02 | End: 2022-08-02 | Stop reason: HOSPADM

## 2022-08-02 RX ORDER — FENTANYL CITRATE 50 UG/ML
INJECTION, SOLUTION INTRAMUSCULAR; INTRAVENOUS AS NEEDED
Status: DISCONTINUED | OUTPATIENT
Start: 2022-08-02 | End: 2022-08-02 | Stop reason: HOSPADM

## 2022-08-02 RX ORDER — ONDANSETRON 2 MG/ML
4 INJECTION INTRAMUSCULAR; INTRAVENOUS AS NEEDED
Status: DISCONTINUED | OUTPATIENT
Start: 2022-08-02 | End: 2022-08-02 | Stop reason: HOSPADM

## 2022-08-02 RX ORDER — LIDOCAINE HYDROCHLORIDE 20 MG/ML
INJECTION, SOLUTION EPIDURAL; INFILTRATION; INTRACAUDAL; PERINEURAL AS NEEDED
Status: DISCONTINUED | OUTPATIENT
Start: 2022-08-02 | End: 2022-08-02 | Stop reason: HOSPADM

## 2022-08-02 RX ORDER — ACETAMINOPHEN 325 MG/1
650 TABLET ORAL ONCE
Status: COMPLETED | OUTPATIENT
Start: 2022-08-02 | End: 2022-08-02

## 2022-08-02 RX ORDER — MORPHINE SULFATE 2 MG/ML
2 INJECTION, SOLUTION INTRAMUSCULAR; INTRAVENOUS
Status: DISCONTINUED | OUTPATIENT
Start: 2022-08-02 | End: 2022-08-02 | Stop reason: HOSPADM

## 2022-08-02 RX ORDER — SODIUM CHLORIDE, SODIUM LACTATE, POTASSIUM CHLORIDE, CALCIUM CHLORIDE 600; 310; 30; 20 MG/100ML; MG/100ML; MG/100ML; MG/100ML
125 INJECTION, SOLUTION INTRAVENOUS CONTINUOUS
Status: DISCONTINUED | OUTPATIENT
Start: 2022-08-02 | End: 2022-08-02 | Stop reason: HOSPADM

## 2022-08-02 RX ORDER — MIDAZOLAM HYDROCHLORIDE 1 MG/ML
1 INJECTION, SOLUTION INTRAMUSCULAR; INTRAVENOUS AS NEEDED
Status: DISCONTINUED | OUTPATIENT
Start: 2022-08-02 | End: 2022-08-02 | Stop reason: HOSPADM

## 2022-08-02 RX ORDER — DIPHENHYDRAMINE HYDROCHLORIDE 50 MG/ML
12.5 INJECTION, SOLUTION INTRAMUSCULAR; INTRAVENOUS AS NEEDED
Status: DISCONTINUED | OUTPATIENT
Start: 2022-08-02 | End: 2022-08-02 | Stop reason: HOSPADM

## 2022-08-02 RX ORDER — MIDAZOLAM HYDROCHLORIDE 1 MG/ML
INJECTION, SOLUTION INTRAMUSCULAR; INTRAVENOUS AS NEEDED
Status: DISCONTINUED | OUTPATIENT
Start: 2022-08-02 | End: 2022-08-02 | Stop reason: HOSPADM

## 2022-08-02 RX ORDER — SODIUM CHLORIDE 9 MG/ML
1000 INJECTION, SOLUTION INTRAVENOUS CONTINUOUS
Status: DISCONTINUED | OUTPATIENT
Start: 2022-08-02 | End: 2022-08-02 | Stop reason: HOSPADM

## 2022-08-02 RX ORDER — SILVER NITRATE 38.21; 12.74 MG/1; MG/1
STICK TOPICAL AS NEEDED
Status: DISCONTINUED | OUTPATIENT
Start: 2022-08-02 | End: 2022-08-02 | Stop reason: HOSPADM

## 2022-08-02 RX ORDER — MIDAZOLAM HYDROCHLORIDE 1 MG/ML
0.5 INJECTION, SOLUTION INTRAMUSCULAR; INTRAVENOUS
Status: DISCONTINUED | OUTPATIENT
Start: 2022-08-02 | End: 2022-08-02 | Stop reason: HOSPADM

## 2022-08-02 RX ORDER — SODIUM CHLORIDE 0.9 % (FLUSH) 0.9 %
5-40 SYRINGE (ML) INJECTION EVERY 8 HOURS
Status: DISCONTINUED | OUTPATIENT
Start: 2022-08-02 | End: 2022-08-02 | Stop reason: HOSPADM

## 2022-08-02 RX ORDER — IBUPROFEN 800 MG/1
800 TABLET ORAL
Qty: 15 TABLET | Refills: 0 | Status: SHIPPED | OUTPATIENT
Start: 2022-08-02

## 2022-08-02 RX ORDER — HYDROMORPHONE HYDROCHLORIDE 1 MG/ML
0.2 INJECTION, SOLUTION INTRAMUSCULAR; INTRAVENOUS; SUBCUTANEOUS
Status: DISCONTINUED | OUTPATIENT
Start: 2022-08-02 | End: 2022-08-02 | Stop reason: HOSPADM

## 2022-08-02 RX ORDER — KETOROLAC TROMETHAMINE 30 MG/ML
INJECTION, SOLUTION INTRAMUSCULAR; INTRAVENOUS AS NEEDED
Status: DISCONTINUED | OUTPATIENT
Start: 2022-08-02 | End: 2022-08-02 | Stop reason: HOSPADM

## 2022-08-02 RX ORDER — DEXAMETHASONE SODIUM PHOSPHATE 4 MG/ML
INJECTION, SOLUTION INTRA-ARTICULAR; INTRALESIONAL; INTRAMUSCULAR; INTRAVENOUS; SOFT TISSUE AS NEEDED
Status: DISCONTINUED | OUTPATIENT
Start: 2022-08-02 | End: 2022-08-02 | Stop reason: HOSPADM

## 2022-08-02 RX ORDER — PROPOFOL 10 MG/ML
INJECTION, EMULSION INTRAVENOUS
Status: DISCONTINUED | OUTPATIENT
Start: 2022-08-02 | End: 2022-08-02 | Stop reason: HOSPADM

## 2022-08-02 RX ORDER — LIDOCAINE HYDROCHLORIDE 10 MG/ML
0.1 INJECTION, SOLUTION EPIDURAL; INFILTRATION; INTRACAUDAL; PERINEURAL AS NEEDED
Status: DISCONTINUED | OUTPATIENT
Start: 2022-08-02 | End: 2022-08-02 | Stop reason: HOSPADM

## 2022-08-02 RX ORDER — PROPOFOL 10 MG/ML
INJECTION, EMULSION INTRAVENOUS AS NEEDED
Status: DISCONTINUED | OUTPATIENT
Start: 2022-08-02 | End: 2022-08-02 | Stop reason: HOSPADM

## 2022-08-02 RX ORDER — SODIUM CHLORIDE 9 MG/ML
50 INJECTION, SOLUTION INTRAVENOUS CONTINUOUS
Status: DISCONTINUED | OUTPATIENT
Start: 2022-08-02 | End: 2022-08-02 | Stop reason: HOSPADM

## 2022-08-02 RX ORDER — FENTANYL CITRATE 50 UG/ML
50 INJECTION, SOLUTION INTRAMUSCULAR; INTRAVENOUS AS NEEDED
Status: DISCONTINUED | OUTPATIENT
Start: 2022-08-02 | End: 2022-08-02 | Stop reason: HOSPADM

## 2022-08-02 RX ORDER — KETAMINE HCL IN 0.9 % NACL 50 MG/5 ML
SYRINGE (ML) INTRAVENOUS AS NEEDED
Status: DISCONTINUED | OUTPATIENT
Start: 2022-08-02 | End: 2022-08-02 | Stop reason: HOSPADM

## 2022-08-02 RX ORDER — OXYCODONE AND ACETAMINOPHEN 5; 325 MG/1; MG/1
1 TABLET ORAL AS NEEDED
Status: DISCONTINUED | OUTPATIENT
Start: 2022-08-02 | End: 2022-08-02 | Stop reason: HOSPADM

## 2022-08-02 RX ADMIN — FENTANYL CITRATE 50 MCG: 50 INJECTION, SOLUTION INTRAMUSCULAR; INTRAVENOUS at 16:53

## 2022-08-02 RX ADMIN — ACETAMINOPHEN 650 MG: 325 TABLET ORAL at 13:46

## 2022-08-02 RX ADMIN — SODIUM CHLORIDE, POTASSIUM CHLORIDE, SODIUM LACTATE AND CALCIUM CHLORIDE 125 ML/HR: 600; 310; 30; 20 INJECTION, SOLUTION INTRAVENOUS at 13:46

## 2022-08-02 RX ADMIN — PROPOFOL 50 MG: 10 INJECTION, EMULSION INTRAVENOUS at 16:42

## 2022-08-02 RX ADMIN — SODIUM CHLORIDE, POTASSIUM CHLORIDE, SODIUM LACTATE AND CALCIUM CHLORIDE: 600; 310; 30; 20 INJECTION, SOLUTION INTRAVENOUS at 16:30

## 2022-08-02 RX ADMIN — LIDOCAINE HYDROCHLORIDE 60 MG: 20 INJECTION, SOLUTION EPIDURAL; INFILTRATION; INTRACAUDAL; PERINEURAL at 16:36

## 2022-08-02 RX ADMIN — DEXAMETHASONE SODIUM PHOSPHATE 8 MG: 4 INJECTION, SOLUTION INTRAMUSCULAR; INTRAVENOUS at 16:44

## 2022-08-02 RX ADMIN — Medication 10 MG: at 16:42

## 2022-08-02 RX ADMIN — SODIUM CHLORIDE, POTASSIUM CHLORIDE, SODIUM LACTATE AND CALCIUM CHLORIDE: 600; 310; 30; 20 INJECTION, SOLUTION INTRAVENOUS at 16:47

## 2022-08-02 RX ADMIN — PROPOFOL 100 MG: 10 INJECTION, EMULSION INTRAVENOUS at 16:36

## 2022-08-02 RX ADMIN — KETOROLAC TROMETHAMINE 15 MG: 30 INJECTION, SOLUTION INTRAMUSCULAR; INTRAVENOUS at 17:08

## 2022-08-02 RX ADMIN — FENTANYL CITRATE 50 MCG: 50 INJECTION, SOLUTION INTRAMUSCULAR; INTRAVENOUS at 16:36

## 2022-08-02 RX ADMIN — MIDAZOLAM 2 MG: 1 INJECTION INTRAMUSCULAR; INTRAVENOUS at 16:30

## 2022-08-02 RX ADMIN — PROPOFOL 100 MCG/KG/MIN: 10 INJECTION, EMULSION INTRAVENOUS at 16:36

## 2022-08-02 RX ADMIN — ONDANSETRON HYDROCHLORIDE 4 MG: 2 INJECTION, SOLUTION INTRAMUSCULAR; INTRAVENOUS at 17:06

## 2022-08-02 RX ADMIN — DOXYCYCLINE 100 MG: 100 INJECTION, POWDER, LYOPHILIZED, FOR SOLUTION INTRAVENOUS at 17:04

## 2022-08-02 NOTE — PERIOP NOTES
Spoke with Alanna Watt in L/D no other surgeon available to perform procedure per Dr. Shekhar Malagon. Message relayed to patient and family.

## 2022-08-02 NOTE — H&P
Gynecology History and Physical    Name: Burton Mahoney MRN: [id number] SSN: [id number]    YOB: 1985  Age: 44 y.o. Sex: female       Subjective:      Chief complaint:  Missed  now with incomplete  and retained POC    Kirsten Schrader is a 44 y.o. [id number] with missed  now incomplete  and retained POC desiring definitive management with suction D&C. She tried misoprostol x 1 dose at home and still with retained POC noted on ultrasound. She is admitted for Procedure(s) (LRB):  SUCTION DILATATION AND CURETTAGE  (ANES. CHOICE) (N/A)        Past Medical History:   Diagnosis Date    Abnormal Pap smear     at age 24, resolved, no tx needed    Ill-defined condition 2021    H/O COVID INFECTION    Rhesus isoimmunization unspecified as to episode of care in pregnancy 2014    pt received Rhogam at 28 weeks    Unspecified breast disorder     breast augmentation      Past Surgical History:   Procedure Laterality Date    HX BREAST RECONSTRUCTION Bilateral 10/21/2016    BILATERAL BREAST IMPLANT EXCHANGE performed by Ines Cisse MD at [address] [place] [address]  2002    tonsillectomy    HX OTHER SURGICAL  2017    ANUPAMA. BREAST AUGMENTATION    HX TONSILLECTOMY      ND BREAST SURGERY PROCEDURE UNLISTED      breast augmentation     OB History          3    Para   3    Term   3            AB        Living   3         SAB        IAB        Ectopic        Molar        Multiple   0    Live Births   3              No Known Allergies  Prior to Admission medications    Medication Sig Start Date End Date Taking? Authorizing Provider   ibuprofen (MOTRIN) 800 mg tablet Take 1 Tab by mouth every eight (8) hours.   Patient not taking: Reported on 2022   Gregory Dahl MD      Family History   Problem Relation Age of Onset    No Known Problems Mother     Other Father 28         IN AUTO ACCIDENT    No Known Problems Brother     Cancer Paternal Grandmother         Breast CA -     Diabetes Paternal Grandfather         Type II    Anesth Problems Neg Hx      Social History     Socioeconomic History    Marital status:      Spouse name: Not on file    Number of children: Not on file    Years of education: Not on file    Highest education level: Not on file   Occupational History    Not on file   Tobacco Use    Smoking status: Never    Smokeless tobacco: Never   Vaping Use    Vaping Use: Never used   Substance and Sexual Activity    Alcohol use: Not Currently     Alcohol/week: 0.0 standard drinks     Comment: OCCASIONALLY    Drug use: No    Sexual activity: Yes     Partners: Male     Birth control/protection: None   Other Topics Concern     Service Not Asked    Blood Transfusions Not Asked    Caffeine Concern Not Asked    Occupational Exposure Not Asked    Hobby Hazards Not Asked    Sleep Concern Not Asked    Stress Concern Not Asked    Weight Concern Not Asked    Special Diet Not Asked    Back Care Not Asked    Exercise Not Asked    Bike Helmet Not Asked    Seat Belt Not Asked    Self-Exams Not Asked   Social History Narrative    Not on file     Social Determinants of Health     Financial Resource Strain: Not on file   Food Insecurity: Not on file   Transportation Needs: Not on file   Physical Activity: Not on file   Stress: Not on file   Social Connections: Not on file   Intimate Partner Violence: Not on file   Housing Stability: Not on file       Review of Systems  As noted in HPI    Objective:     Vitals:    22 1018 22 1338   BP:  134/89   Pulse:  84   Resp:  18   Temp:  98.2 °F (36.8 °C)   SpO2:  100%   Weight: 61.2 kg (135 lb) 61.2 kg (135 lb)   Height: 5' 6\" (1.676 m) 5' 6\" (1.676 m)       Physical Exam  Gen: NAD  Resp: CTAB  CV: RRR  Abd: soft, NT, ND, + BS  Ext: no edema  Pelvic: deferred to OR    Assessment:   Missed  now with incomplete  with retained POC desiring definitive D&C    Plan:     1. Procedure(s) (LRB):  SUCTION DILATATION AND CURETTAGE  (ANES. CHOICE) (N/A)  Discussed the risks of surgery including the risks of bleeding, infection, deep vein thrombosis, and surgical injuries to internal organs including but not limited to the bowels, bladder, rectum, and female reproductive organs. The patient understands the risks; any and all questions were answered to the patient's satisfaction. Rh neg, received rhogam in office on 7/29. Consented x 2. Doxy prior to procedure. Proceed to OR as planned.     Nedra Carrington MD

## 2022-08-02 NOTE — OP NOTES
SUCTION CURETTAGE FULL OP NOTE    Patient - [facility] Record Number - [id number]   YOB: 1985      DATE AND TIME OF PROCEDURE: 2022  4:15 PM     PREOPERATIVE DIAGNOSIS:   Incomplete  [O03.4]    POSTOPERATIVE DIAGNOSIS:   Incomplete  [O03.4]    PROCEDURE: Procedure(s):  SUCTION DILATATION AND CURETTAGE  (ANES. CHOICE)     SURGEON:  Chacho Heaton MD    ASSISTANT: None     ANESTHESIA: Other     QUANTITATIVE BLOOD LOSS AT PROCEDURE END: less than 50 ml     COMPLICATIONS: * No complications entered in OR log *     IMPLANTS: *No implants in the log*    SPECIMENS: Products of conception    FINDINGS: Products of conception    DESCRIPTION OF PROCEDURE: The patient was placed on the operating room table in the supine position and placed under general endotracheal anesthesia. Time out was done to confirm the operating procedure, surgeon, patient and site. Once confirmed by the team, procedure was started. PROCEDURE: Patient was placed on the operating table in the supine position and after induction of anesthesia she was placed in the dorsal lithotomy position and prepped and draped in the usual fashion for vaginal surgery. Cervix was exposed with a weighted vaginal speculum and grasped with a single-tooth tenaculum. A curved 8 suction curette device was then introduced into the endometrial cavity after it was sounded to approximately 9 cm. Thorough suction curettage followed by sharp curettage with a large curette followed again by suction curettage was performed until the suction returned no further clot or products of conception. Adequate curettage was felt to be obtained. The uterus was massaged. Hemostasis appeared normal; 10 mL of 1% lidocaine were injected paracervically. Instruments were removed. The patient went to the recovery room in satisfactory condition. All counts were correct times two.   Of note blood type was RH negative and the patient has already received rhogam in the office.

## 2022-08-02 NOTE — ANESTHESIA PREPROCEDURE EVALUATION
Relevant Problems   No relevant active problems       Anesthetic History   No history of anesthetic complications            Review of Systems / Medical History  Patient summary reviewed, nursing notes reviewed and pertinent labs reviewed    Pulmonary  Within defined limits                 Neuro/Psych   Within defined limits           Cardiovascular  Within defined limits                Exercise tolerance: >4 METS     GI/Hepatic/Renal  Within defined limits              Endo/Other  Within defined limits           Other Findings   Comments: Incomplete Ab           Physical Exam    Airway  Mallampati: II  TM Distance: 4 - 6 cm  Neck ROM: normal range of motion   Mouth opening: Normal     Cardiovascular  Regular rate and rhythm,  S1 and S2 normal,  no murmur, click, rub, or gallop             Dental  No notable dental hx       Pulmonary  Breath sounds clear to auscultation               Abdominal  GI exam deferred       Other Findings            Anesthetic Plan    ASA: 2  Anesthesia type: MAC          Induction: Intravenous  Anesthetic plan and risks discussed with: Patient

## 2022-08-02 NOTE — PERIOP NOTES
Bedside and Verbal shift change report given to JONH Taylor RN  (oncoming nurse) by QUENTIN Culp (offgoing nurse).  Report included the following information SBAR, Intake/Output, Recent Results, Med Rec Status, and Pre Procedure Checklist.

## 2022-08-02 NOTE — PERIOP NOTES
Spoke with Yumiko in [place] Dr. Casey Petersen in surgery to be delayed  possibly another 1.5 hours. Message relayed to patient and family. Patient and family requesting another surgeon to perform procedure. Yumiko to notify Dr. Casey Petersen.

## 2022-08-02 NOTE — DISCHARGE INSTRUCTIONS
After Care Instructions For Your D&C      You may resume your usual diet once the nausea resolves. Initially, try sips of warm fluids and a bland diet. Avoid heavy lifting and straining. Gradually increase your activity. First, try walking and doing light activity around the house. Resume your normal habits if no significant discomfort or bleeding develops. Most women can return to work within one to four days after this procedure. You may take showers. Avoid using a tub bath, swimming pool or hot tub until after your check-up. Do not place anything in your vagina until after your postoperative visit. Do not   douche, use tampons, or have intercourse because this may cause bleeding and   infection. You may initially experience a heavy bloody discharge. This should not be more than your menstrual flow. Over the next several days, the flow should steadily decrease. Typically following the procedure, there is little or no pain. You may feel cramps in your lower abdomen. Tylenol may relieve mild cramping. If pain medication does not improve your symptoms, you should contact your physician. Contact the office if you have excessive bleeding (saturating a pad an hour for two hours or passing large clots). It is also necessary to speak with your physician if you develop chills, a temperature greater than 100.4, difficulty voiding or burning on urination. Your physician may want to see you in the office after your D&C. Please call for an appointment if this has not already been arranged. Our office phone number is [phone]. If appropriate, the microscopic results from your procedure will be discussed at this follow-up visit.         DISCHARGE SUMMARY from Nurse    The following personal items collected during your admission are returned to you:   Dental Appliance: Dental Appliances: None  Vision:    Hearing Aid:    Jewelry: Jewelry: None  Clothing: Clothing: Other (comment) (CLOTHING & SHOES TO PACU)  Other Valuables: Other Valuables: None  Valuables sent to safe: ALL CLOTHING/VALUABLES RETURNED TO PATIENT BEFORE D/C HOME    PATIENT INSTRUCTIONS:    The first meal should be something that is light; not greasy or spicy. If this is tolerated, then advance to regular diet as tolerated. Anesthesia Discharge Instructions    After general anesthesia or intervenous sedation, for 24 hours or while taking prescription Narcotics:  Limit your activities  Do not drive or operate hazardous machinery  If you have not urinated within 8 hours after discharge, please contact your surgeon on call. Do not make important personal or business decisions  Do not drink alcoholic beverages    Report the following to your surgeon:  Excessive pain, swelling, redness or odor of or around the surgical area  Temperature over 100.5 degrees  Nausea and vomiting lasting longer than 4 hours or if unable to take medication  Any signs of decreased circulation or nerve impairment to extremity:  Change in color, persistent numbness, tingling, coldness or increased pain. Any questions    Pain  Take pain medication as directed by your doctor   Call your doctor if pain is NOT relieved by medication  DO NOT take aspirin or blood thinners until directed by your doctor    TYLENOL WAS GIVEN AT 1:45 PM..   TORADOL (NSAID) WAS GIVEN AT 5 PM.

## 2022-08-03 NOTE — ANESTHESIA POSTPROCEDURE EVALUATION
Procedure(s):  SUCTION DILATATION AND CURETTAGE  (ANES. CHOICE). MAC    Anesthesia Post Evaluation        Patient location during evaluation: PACU  Note status: Adequate. Level of consciousness: responsive to verbal stimuli and sleepy but conscious  Pain management: satisfactory to patient  Airway patency: patent  Anesthetic complications: no  Cardiovascular status: acceptable  Respiratory status: acceptable  Hydration status: acceptable  Comments: +Post-Anesthesia Evaluation and Assessment    Patient: Hugo Morrow MRN: [id number]  SSN: [id number]   YOB: 1985  Age: 44 y.o. Sex: female          Cardiovascular Function/Vital Signs    /78   Pulse 74   Temp 36.5 °C (97.7 °F)   Resp 17   Ht 5' 6\" (1.676 m)   Wt 61.2 kg (135 lb)   SpO2 98%   BMI 21.79 kg/m²     Patient is status post Procedure(s):  SUCTION DILATATION AND CURETTAGE  (ANES. CHOICE). Nausea/Vomiting: Controlled. Postoperative hydration reviewed and adequate. Pain:  Pain Scale 1: Numeric (0 - 10) (08/02/22 1810)  Pain Intensity 1: 0 (08/02/22 1810)   Managed. Neurological Status:   Neuro (WDL): Within Defined Limits (08/02/22 1810)   At baseline. Mental Status and Level of Consciousness: Arousable. Pulmonary Status:   O2 Device: None (Room air) (08/02/22 1810)   Adequate oxygenation and airway patent. Complications related to anesthesia: None    Post-anesthesia assessment completed. No concerns. I have evaluated the patient and the patient is stable and ready to be discharged from PACU . Signed By: Inga Bach MD    8/3/2022        INITIAL Post-op Vital signs:   Vitals Value Taken Time   /78 08/02/22 1815   Temp 36.5 °C (97.7 °F) 08/02/22 1732   Pulse 70 08/02/22 1818   Resp 17 08/02/22 1818   SpO2 97 % 08/02/22 1818   Vitals shown include unvalidated device data.

## 2024-07-08 ENCOUNTER — TRANSCRIBE ORDERS (OUTPATIENT)
Facility: HOSPITAL | Age: 39
End: 2024-07-08

## 2024-07-08 DIAGNOSIS — Z12.31 VISIT FOR SCREENING MAMMOGRAM: Primary | ICD-10-CM

## 2024-07-26 ENCOUNTER — HOSPITAL ENCOUNTER (OUTPATIENT)
Facility: HOSPITAL | Age: 39
Discharge: HOME | End: 2024-07-26
Attending: OBSTETRICS & GYNECOLOGY
Payer: COMMERCIAL

## 2024-07-26 VITALS — WEIGHT: 135 LBS | BODY MASS INDEX: 21.69 KG/M2 | HEIGHT: 66 IN

## 2024-07-26 DIAGNOSIS — Z12.31 VISIT FOR SCREENING MAMMOGRAM: ICD-10-CM

## 2024-07-26 PROCEDURE — 77067 SCR MAMMO BI INCL CAD: CPT

## (undated) DEVICE — TOWEL SURG W17XL27IN STD BLU COT NONFENESTRATED PREWASHED

## (undated) DEVICE — PREMIUM WET SKIN PREP TRAY: Brand: MEDLINE INDUSTRIES, INC.

## (undated) DEVICE — FILTER SET UTER VAC CURET SYS -- GYRUS

## (undated) DEVICE — NEEDLE SPNL 22GA L3.5IN BLK HUB S STL REG WALL FIT STYL W/

## (undated) DEVICE — SPONGE GZ W4XL4IN COT RADPQ HIGHLY ABSRB

## (undated) DEVICE — DRAPE,LITHOTOMY,STERILE: Brand: MEDLINE

## (undated) DEVICE — GOWN,SIRUS,FABRNF,XL,20/CS: Brand: MEDLINE

## (undated) DEVICE — GARMENT,MEDLINE,DVT,INT,CALF,MED, GEN2: Brand: MEDLINE

## (undated) DEVICE — HANDLE LT SNAP ON ULT DURABLE LENS FOR TRUMPF ALC DISPOSABLE

## (undated) DEVICE — TRAP TISS DISP FOR COLL SYS BERK SAFETOUCH

## (undated) DEVICE — SHEET,DRAPE,UNDERBUTTOCK,GRAD POUCH,PORT: Brand: MEDLINE

## (undated) DEVICE — COLLECTION KT SUC TISS BERK -- GYRUS

## (undated) DEVICE — TUBING SUCT L6FT ID0.5IN CLR PLAS M CONN

## (undated) DEVICE — COVER,TABLE,60X90,STERILE: Brand: MEDLINE

## (undated) DEVICE — CURETTE VAC DIA8MM PLAS CRV OPN TIP RIG DISP VACURET D/E

## (undated) DEVICE — PAD,SANITARY,11 IN,MAXI,N-STRL,IND WRAP: Brand: MEDLINE

## (undated) DEVICE — SYR 10ML LUER LOK 1/5ML GRAD --